# Patient Record
Sex: MALE | Race: WHITE | NOT HISPANIC OR LATINO | URBAN - METROPOLITAN AREA
[De-identification: names, ages, dates, MRNs, and addresses within clinical notes are randomized per-mention and may not be internally consistent; named-entity substitution may affect disease eponyms.]

---

## 2018-05-13 ENCOUNTER — EMERGENCY (EMERGENCY)
Facility: HOSPITAL | Age: 38
LOS: 1 days | Discharge: HOME | End: 2018-05-13
Attending: EMERGENCY MEDICINE
Payer: SELF-PAY

## 2018-05-13 VITALS
RESPIRATION RATE: 15 BRPM | HEART RATE: 57 BPM | DIASTOLIC BLOOD PRESSURE: 67 MMHG | SYSTOLIC BLOOD PRESSURE: 100 MMHG | TEMPERATURE: 98 F | OXYGEN SATURATION: 99 %

## 2018-05-13 VITALS — WEIGHT: 154.98 LBS | HEIGHT: 69 IN

## 2018-05-13 PROCEDURE — 12002 RPR S/N/AX/GEN/TRNK2.6-7.5CM: CPT

## 2018-05-13 PROCEDURE — 99283 EMERGENCY DEPT VISIT LOW MDM: CPT | Mod: 25

## 2018-05-13 PROCEDURE — 73130 X-RAY EXAM OF HAND: CPT

## 2018-05-13 PROCEDURE — 73130 X-RAY EXAM OF HAND: CPT | Mod: 26,LT

## 2018-05-13 RX ORDER — IBUPROFEN 200 MG
600 TABLET ORAL ONCE
Qty: 0 | Refills: 0 | Status: COMPLETED | OUTPATIENT
Start: 2018-05-13 | End: 2018-05-13

## 2018-05-13 RX ADMIN — Medication 600 MILLIGRAM(S): at 22:30

## 2018-05-13 NOTE — ED PROVIDER NOTE - OBJECTIVE STATEMENT
36 y/o M pt with PMHx and PSHx presents to the ED c/o pain and a cut on L hand 4th digit after tripping over a garbage bag containing metal and glass. Pt reports that he is UTD with tetanus. Pt denies any other complaints. NKDA.

## 2018-05-13 NOTE — ED PROCEDURE NOTE - CPROC ED POST PROC CARE GUIDE1
Instructed patient/caregiver to follow-up with primary care physician./Verbal/written post procedure instructions were given to patient/caregiver./Instructed patient/caregiver regarding signs and symptoms of infection./Keep the cast/splint/dressing clean and dry.
Instructed patient/caregiver regarding signs and symptoms of infection./Verbal/written post procedure instructions were given to patient/caregiver./Instructed patient/caregiver to follow-up with primary care physician.

## 2018-05-13 NOTE — ED PROVIDER NOTE - PHYSICAL EXAMINATION
SKIN: 3cm laceration to dorsal aspect of L hand 4th digit. No tenderness, no bone involvement. SKIN: 1cm laceration to dorsal aspect of L hand 4th digit. No tenderness, no bone involvement.

## 2018-05-13 NOTE — ED ADULT NURSE NOTE - OBJECTIVE STATEMENT
states he tripped and fell hit a garbage can with,cut  left 4th finger with a piece of glass.Left 4th finger irrigated with a NS by  then laceration sutured by .

## 2018-05-13 NOTE — ED PROCEDURE NOTE - CPROC ED TIME OUT STATEMENT1
“Patient's name, , procedure and correct site were confirmed during the [place] Timeout.”
“Patient's name, , procedure and correct site were confirmed during the [place] Timeout.”

## 2018-05-20 ENCOUNTER — EMERGENCY (EMERGENCY)
Facility: HOSPITAL | Age: 38
LOS: 1 days | Discharge: HOME | End: 2018-05-20
Attending: EMERGENCY MEDICINE
Payer: SELF-PAY

## 2018-05-20 VITALS
HEART RATE: 67 BPM | HEIGHT: 67 IN | SYSTOLIC BLOOD PRESSURE: 115 MMHG | OXYGEN SATURATION: 100 % | DIASTOLIC BLOOD PRESSURE: 72 MMHG | RESPIRATION RATE: 16 BRPM | TEMPERATURE: 98 F | WEIGHT: 154.98 LBS

## 2018-05-20 PROCEDURE — G0463: CPT

## 2018-05-20 NOTE — ED ADULT NURSE NOTE - OBJECTIVE STATEMENT
pt came in with c/o 38 y/o male with c/o suture /staple removal. pt wound clean and dry no signs of any infection.

## 2018-05-20 NOTE — ED ADULT TRIAGE NOTE - CHIEF COMPLAINT QUOTE
pt came for suture removal in left hand 4th finger, sutured 1 week ago, wound appears clean & dry, pt denies fevers ,pain, swelling or drainage

## 2018-05-20 NOTE — ED PROVIDER NOTE - OBJECTIVE STATEMENT
38 y/o M with no significant PMHx presents to ED need for suture removal. Pt had five sutures placed to L fourth finger one week ago. Pt denies any fever, pain, discharge or any other complaints.

## 2018-05-20 NOTE — ED PROVIDER NOTE - MEDICAL DECISION MAKING DETAILS
36 y/o M here w/ need for suture removal. Wound is clean, dry and intact, no signs of infection. 5 sutures removed, will discharge pt home.

## 2018-08-01 ENCOUNTER — EMERGENCY (EMERGENCY)
Facility: HOSPITAL | Age: 38
LOS: 1 days | Discharge: HOME | End: 2018-08-01
Attending: EMERGENCY MEDICINE
Payer: SELF-PAY

## 2018-08-01 VITALS
SYSTOLIC BLOOD PRESSURE: 105 MMHG | OXYGEN SATURATION: 100 % | HEART RATE: 55 BPM | RESPIRATION RATE: 16 BRPM | DIASTOLIC BLOOD PRESSURE: 69 MMHG

## 2018-08-01 VITALS
SYSTOLIC BLOOD PRESSURE: 124 MMHG | HEART RATE: 79 BPM | DIASTOLIC BLOOD PRESSURE: 77 MMHG | HEIGHT: 68 IN | RESPIRATION RATE: 16 BRPM | OXYGEN SATURATION: 97 % | TEMPERATURE: 99 F | WEIGHT: 156.09 LBS

## 2018-08-01 LAB
TROPONIN I SERPL-MCNC: <0.015 NG/ML — SIGNIFICANT CHANGE UP (ref 0–0.04)
TROPONIN I SERPL-MCNC: <0.015 NG/ML — SIGNIFICANT CHANGE UP (ref 0–0.04)

## 2018-08-01 PROCEDURE — 99285 EMERGENCY DEPT VISIT HI MDM: CPT | Mod: 25

## 2018-08-01 PROCEDURE — 71045 X-RAY EXAM CHEST 1 VIEW: CPT | Mod: 26

## 2018-08-01 PROCEDURE — 99053 MED SERV 10PM-8AM 24 HR FAC: CPT

## 2018-08-01 PROCEDURE — 71045 X-RAY EXAM CHEST 1 VIEW: CPT

## 2018-08-01 PROCEDURE — 36415 COLL VENOUS BLD VENIPUNCTURE: CPT

## 2018-08-01 PROCEDURE — 84484 ASSAY OF TROPONIN QUANT: CPT

## 2018-08-01 PROCEDURE — 99283 EMERGENCY DEPT VISIT LOW MDM: CPT | Mod: 25

## 2018-08-01 PROCEDURE — 93005 ELECTROCARDIOGRAM TRACING: CPT
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
## 2018-08-01 NOTE — ED PROVIDER NOTE - MEDICAL DECISION MAKING DETAILS
Pt  resting, in no distress, trop x 2 neg, EKG NSR at 76 bpm, CXR NAPD. Pt is well appearing walking with normal gait, stable for discharge and follow up with medical doctor. Pt educated on care and need for follow up. Discussed anticipatory guidance and return precautions. Questions answered. I had a detailed discussion with the patient and/or guardian regarding the historical points, exam findings, and any diagnostic results supporting the discharge diagnosis.  . pt given contact to f/u with cardiologist.

## 2018-08-01 NOTE — ED ADULT NURSE NOTE - NSIMPLEMENTINTERV_GEN_ALL_ED
Implemented All Universal Safety Interventions:  [place] to call system. Call bell, personal items and telephone within reach. Instruct patient to call for assistance. Room bathroom lighting operational. Non-slip footwear when patient is off stretcher. Physically safe environment: no spills, clutter or unnecessary equipment. Stretcher in lowest position, wheels locked, appropriate side rails in place.

## 2019-05-19 ENCOUNTER — EMERGENCY (EMERGENCY)
Facility: HOSPITAL | Age: 39
LOS: 1 days | Discharge: HOME | End: 2019-05-19
Attending: EMERGENCY MEDICINE
Payer: MEDICAID

## 2019-05-19 VITALS
WEIGHT: 145.06 LBS | DIASTOLIC BLOOD PRESSURE: 78 MMHG | RESPIRATION RATE: 16 BRPM | SYSTOLIC BLOOD PRESSURE: 116 MMHG | HEIGHT: 69 IN | OXYGEN SATURATION: 100 % | HEART RATE: 89 BPM | TEMPERATURE: 98 F

## 2019-05-19 PROCEDURE — 99281 EMR DPT VST MAYX REQ PHY/QHP: CPT

## 2019-05-19 NOTE — ED ADULT NURSE NOTE - CHPI ED NUR SYMPTOMS NEG
no nausea/no tingling/no weakness/no dizziness/no fever/no chills/no pain/no vomiting/no decreased eating/drinking

## 2019-05-20 NOTE — ED PROVIDER NOTE - ENMT, MLM
Airway patent, Nasal mucosa clear. Mouth with normal mucosa. Throat has no vesicles, no oropharyngeal exudates and uvula is midline.  retained suture over Left lower gingiva

## 2019-05-20 NOTE — ED PROVIDER NOTE - CLINICAL SUMMARY MEDICAL DECISION MAKING FREE TEXT BOX
patient presents for suture removal. 1 suture visualized overlying gingiva and removed.   patient stable for discharge.

## 2019-05-20 NOTE — ED PROVIDER NOTE - NSFOLLOWUPINSTRUCTIONS_ED_ALL_ED_FT
Continue proper dental hygiene, brush teeth after meals and use mouthwash daily.  Followup with dentistry for reevaluation.

## 2019-05-20 NOTE — ED PROVIDER NOTE - OBJECTIVE STATEMENT
39yo M presents to ED for suture removal. Reports he had a tooth extracted 2 wks ago and dentist placed sutures which he was supposed to have removed 1wk later. Reports he was unable to see his dentist at that time thus come to ED tonight for suture removal. Patient reports he does not know how many sutures were placed by dentist.

## 2019-11-08 NOTE — ED ADULT TRIAGE NOTE - AS HEIGHT TYPE
stated
Bill For Individual Tests Below?: no
Venipuncture Paragraph: An alcohol pad was applied to the venipuncture site. Venipuncture was performed using a butterfly needle. Pressure and a bandaid was applied to the site. No complications were noted.
Detail Level: None

## 2020-05-13 NOTE — ED ADULT TRIAGE NOTE - PAIN RATING/NUMBER SCALE (0-10): ACTIVITY
4 Colchicine Pregnancy And Lactation Text: This medication is Pregnancy Category C and isn't considered safe during pregnancy. It is excreted in breast milk.

## 2021-08-21 ENCOUNTER — EMERGENCY (EMERGENCY)
Facility: HOSPITAL | Age: 41
LOS: 1 days | Discharge: HOME | End: 2021-08-21
Attending: EMERGENCY MEDICINE
Payer: SELF-PAY

## 2021-08-21 PROCEDURE — 99053 MED SERV 10PM-8AM 24 HR FAC: CPT

## 2021-08-21 PROCEDURE — 99285 EMERGENCY DEPT VISIT HI MDM: CPT

## 2021-08-22 VITALS
HEART RATE: 72 BPM | SYSTOLIC BLOOD PRESSURE: 106 MMHG | TEMPERATURE: 98 F | DIASTOLIC BLOOD PRESSURE: 72 MMHG | WEIGHT: 139.99 LBS | HEIGHT: 69 IN | RESPIRATION RATE: 18 BRPM | OXYGEN SATURATION: 97 %

## 2021-08-22 PROCEDURE — 70486 CT MAXILLOFACIAL W/O DYE: CPT | Mod: 26,MA

## 2021-08-22 PROCEDURE — 72125 CT NECK SPINE W/O DYE: CPT | Mod: 26,MA

## 2021-08-22 PROCEDURE — 99284 EMERGENCY DEPT VISIT MOD MDM: CPT | Mod: 25

## 2021-08-22 PROCEDURE — 72125 CT NECK SPINE W/O DYE: CPT | Mod: MA

## 2021-08-22 PROCEDURE — 70486 CT MAXILLOFACIAL W/O DYE: CPT | Mod: MA
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
## 2021-08-22 NOTE — ED PROVIDER NOTE - MUSCULOSKELETAL, MLM
Spine appears normal, range of motion is not limited, no muscle. moving all extremity freely. mild ttp at right deltoid and lateral right knee, no swelling
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
## 2021-08-22 NOTE — ED PROVIDER NOTE - PRINCIPAL DIAGNOSIS
Fabian Diana discharged to home accompanied by RN.   Patient provided with the following educational materials upon discharge:  Colostomy education.   Valuables and belongings sent with patient.   discharge summary, discharge instructions, medications and follow up appointments reviewed with patient and RN.  Patient and RN verbalized understanding.    Patient educated extensively regarding pain control and patient states \"I will be in pain either way if I'm at home or here. I'd rather be at home.\"    Contusion

## 2021-08-22 NOTE — ED PROVIDER NOTE - PATIENT PORTAL LINK FT
You can access the FollowMyHealth Patient Portal offered by [facility] by registering at the following website: [email or web address]. By joining [facility]’s FollowMyHealth portal, you will also be able to view your health information using other applications (apps) compatible with our system.

## 2021-08-22 NOTE — ED ADULT NURSE NOTE - NS_SISCREENINGSR_GEN_ALL_ED
THAO SHIELDS  28y  Female [id number]    HPI: 29 yo  LMP 3/19/19 here with +u bHCG on  and 4 days of abdominal cramping+vaginal bleeding. Patient took home pregnancy test on  as she had missed her period which is usually regular and was having some abdominal cramping. Two days later she started having vaginal bleeding and passing of clots, she used 4 pads in the last 24 hours, not fully soaked. This is similar to her regular periods but she was concerned about passage of clots in the setting of + pregnancy test. She has not seen an OB/GYN in almost three years, does not know previous providers name. She has mild abdominal pain 4-5/10 similar to periods not associate with position, eating, or moving. She denies fever, chills, CP, SOB, n/v, dysuria, constipation, diarrhea.    Name of GYN Physician: Unknown    POB:  2.5 years ago, +bHCG prior to laparoscopic cholecystectomy. She then had a D&C outpatient two to three weeks later. Believes she was about 10-12 weeks at that time.   Pgyn: Herpes dx'd three years ago by OB, no lesions that she knows of, no antivirals. Denies fibroids, cysts, endometriosis, Abnormal pap smears (last pap 3 years ago).     Home meds: None    Allergies: No Known Allergies    PAST MEDICAL & SURGICAL HISTORY:  TOP s/p D&C     No significant past surgical history  H/O hernia repair: age 7  Laparoscopic cholecystectomy 2016      Social History:  Denies smoking use, drug use, alcohol use.   +occasional social alcohol use    Vital Signs Last 24 Hrs  T(C): 36.7 (2019 15:46), Max: 36.9 (2019 12:05)  T(F): 98 (2019 15:46), Max: 98.4 (2019 12:05)  HR: 78 (2019 15:46) (72 - 78)  BP: 124/88 (2019 15:46) (124/88 - 132/85)  BP(mean): --  RR: 18 (2019 15:46) (17 - 18)  SpO2: 99% (2019 15:46) (98% - 99%)    Physical Exam:   General: sitting comftorably in bed, NAD   HEENT: neck supple, full ROM  CV: RR S1S2 no m/r/g  Lungs: CTA b/l, good air flow b/l   Back: No CVA tenderness  Abd: Soft, non-tender, non-distended.  Bowel sounds present.    :  No bleeding on pad.    External labia wnl.  Bimanual exam with no cervical motion tenderness, uterus wnl, adnexa non palpable b/l.  Cervix closed vs. Cervix dilated    cm   Speculum Exam: No active bleeding from os.  Physiologic discharge.    Ext: non-tender b/l, no edema     LABS:                              14.1   6.6   )-----------( 258      ( 2019 13:12 )             40.6         137  |  103  |  10  ----------------------------<  99  4.2   |  23  |  0.54    Ca    9.2      2019 13:12    TPro  7.4  /  Alb  4.6  /  TBili  0.5  /  DBili  x   /  AST  22  /  ALT  23  /  AlkPhos  38<L>      I&O's Detail      Urinalysis Basic - ( 2019 12:50 )    Color: Light Orange / Appearance: Slightly Turbid / S.030 / pH: x  Gluc: x / Ketone: Negative  / Bili: Negative / Urobili: Negative   Blood: x / Protein: 30 mg/dL / Nitrite: Negative   Leuk Esterase: Negative / RBC: 552 /hpf / WBC 7 /HPF   Sq Epi: x / Non Sq Epi: 5 /hpf / Bacteria: Negative        RADIOLOGY & ADDITIONAL STUDIES: THAO SHIELDS  28y  Female [id number]    HPI: 27 yo  LMP 3/19/19 here with +u bHCG on  and 4 days of abdominal cramping+vaginal bleeding. Patient took home pregnancy test on  as she had missed her period which is usually regular and was having some abdominal cramping. Two days later she started having vaginal bleeding and passing of clots, she used 4 pads in the last 24 hours, not fully soaked. This is similar to her regular periods but she was concerned about passage of clots in the setting of + pregnancy test. She has not seen an OB/GYN in almost three years, does not know previous providers name. She has mild abdominal pain 4-5/10 similar to periods not associate with position, eating, or moving. She denies fever, chills, CP, SOB, n/v, dysuria, constipation, diarrhea.    Name of GYN Physician: Unknown    POB:  2.5 years ago, +bHCG prior to laparoscopic cholecystectomy. She then had a D&C outpatient two to three weeks later. Believes she was about 10-12 weeks at that time.     PGyn: Herpes dx'd three years ago by OB, no lesions that she knows of, no antivirals. Denies fibroids, cysts, endometriosis, Abnormal pap smears (last pap 3 years ago). Never used hormonal contraception, condom use inconsistent.    Home meds: None    Allergies: No Known Allergies    PAST MEDICAL & SURGICAL HISTORY:  TOP s/p D&C     No significant past surgical history  H/O hernia repair: age 7  Laparoscopic cholecystectomy 2016      Social History:  Denies smoking use, drug use.  +occasional social alcohol use. Currently has two male partners.    Vital Signs Last 24 Hrs  T(C): 36.7 (2019 15:46), Max: 36.9 (2019 12:05)  T(F): 98 (2019 15:46), Max: 98.4 (2019 12:05)  HR: 78 (2019 15:46) (72 - 78)  BP: 124/88 (2019 15:46) (124/88 - 132/85)  BP(mean): --  RR: 18 (2019 15:46) (17 - 18)  SpO2: 99% (2019 15:46) (98% - 99%)    Physical Exam:   General: sitting comfortably in bed, NAD   Back: No CVA tenderness  Abd: Soft, mild LLQ tenderness with deep palpation, non-distended.  Bowel sounds present.    :  Scant bleeding on pad. External labia wnl.  Bimanual exam with no cervical motion tenderness, uterus wnl, adnexa non palpable b/l, L adnexal tenderness on exam.  Cervix closed.   Speculum Exam: No active bleeding from os. Dark red blood in the vault 5-10cc with some clots.    Ext: non-tender b/l, no edema     LABS:                        14.1   6.6   )-----------( 258      ( 2019 13:12 )             40.6         137  |  103  |  10  ----------------------------<  99  4.2   |  23  |  0.54    Ca    9.2      2019 13:12    TPro  7.4  /  Alb  4.6  /  TBili  0.5  /  DBili  x   /  AST  22  /  ALT  23  /  AlkPhos  38<L>      I&O's Detail      Urinalysis Basic - ( 2019 12:50 )    Color: Light Orange / Appearance: Slightly Turbid / S.030 / pH: x  Gluc: x / Ketone: Negative  / Bili: Negative / Urobili: Negative   Blood: x / Protein: 30 mg/dL / Nitrite: Negative   Leuk Esterase: Negative / RBC: 552 /hpf / WBC 7 /HPF   Sq Epi: x / Non Sq Epi: 5 /hpf / Bacteria: Negative    RADIOLOGY & ADDITIONAL STUDIES:  < from: US Transvaginal, OB (19 @ 14:10) >    INTERPRETATION:  CLINICAL INFORMATION: Pregnant. Vaginal bleeding.   Abdominal pain.    LMP: 3/19/2019. Gestational age would be 5 weeks, 1 day by LMP.    COMPARISON: 2016.    TECHNIQUE:     The transabdominal and transvaginal pelvic sonography was performed.   Color and spectral Doppler imaging was performed to assess for blood flow   within the ovaries.        FINDINGS:    Uterus: Anteverted.  7.8 cm x 3.6 cm x 4.2 cm. No intrauterine gestation.    Endometrium: 6 mm. Minimally heterogeneous.    Right ovary: 2.5 cm x 2.1 cm x 1.7 cm. Arterial and venous blood flow is   seen within the right ovarianparenchyma with color and spectral Doppler   imaging. Within normal limits.        Left ovary: 2.6 cm x 1.8 cm x 1.6 cm. Arterial and venous blood flow is   seen within the left ovarian parenchyma with color and spectral Doppler   imaging. Within normal limits.        No extra ovarian mass.    Fluid: Trace free fluid within the inferior cul-de-sac.    IMPRESSION:    No intrauterine gestation.    No extra ovarian mass.    Pregnancy of unknown location.    Cannot exclude loss of pregnancy.    Please correlate with serial beta hCG levels. Recommend follow-up repeat   pelvic sonography as warranted.    < end of copied text > Negative

## 2022-05-23 ENCOUNTER — NON-APPOINTMENT (OUTPATIENT)
Age: 42
End: 2022-05-23

## 2022-05-23 ENCOUNTER — EMERGENCY (EMERGENCY)
Facility: HOSPITAL | Age: 42
LOS: 1 days | Discharge: LEFT BEFORE DISCHARGE | End: 2022-05-23
Attending: EMERGENCY MEDICINE
Payer: MEDICAID

## 2022-05-23 VITALS
WEIGHT: 135.36 LBS | RESPIRATION RATE: 18 BRPM | OXYGEN SATURATION: 97 % | DIASTOLIC BLOOD PRESSURE: 77 MMHG | TEMPERATURE: 99 F | HEART RATE: 64 BPM | HEIGHT: 69 IN | SYSTOLIC BLOOD PRESSURE: 117 MMHG

## 2022-05-23 PROCEDURE — L9991: CPT

## 2022-05-23 PROCEDURE — 73130 X-RAY EXAM OF HAND: CPT | Mod: 26,RT

## 2022-05-23 PROCEDURE — 73130 X-RAY EXAM OF HAND: CPT

## 2022-05-23 PROCEDURE — 99283 EMERGENCY DEPT VISIT LOW MDM: CPT | Mod: 25

## 2022-05-23 RX ORDER — IBUPROFEN 200 MG
600 TABLET ORAL ONCE
Refills: 0 | Status: COMPLETED | OUTPATIENT
Start: 2022-05-23 | End: 2022-05-23

## 2022-05-23 RX ADMIN — Medication 600 MILLIGRAM(S): at 02:10

## 2022-05-23 NOTE — ED ADULT NURSE REASSESSMENT NOTE - NS ED NURSE REASSESS COMMENT FT1
0340hr- patient wants to leave to eat and come back in 2hrs, informed that he needs to register again once he leaves and come back.  Was called 30 minutes right after he was registered and was not available.- [place]
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
## 2022-05-23 NOTE — ED ADULT NURSE NOTE - OBJECTIVE STATEMENT
pt states he fell on the street, pt c/o right hand pain, pt is aox3 , steady gait, medicated as prescribed

## 2022-05-23 NOTE — ED PROVIDER NOTE - CLINICAL SUMMARY MEDICAL DECISION MAKING FREE TEXT BOX
At approximately 345a pt informed me that he was leaving ED to get food and will return. Pt left ED prior to full assessment.  Pt eloped.

## 2022-07-26 NOTE — ED ADULT NURSE NOTE - NS ED NURSE DC INFO COMPLEXITY
Simple: Patient demonstrates quick and easy understanding Cibinqo Pregnancy And Lactation Text: It is unknown if this medication will adversely affect pregnancy or breast feeding.  You should not take this medication if you are currently pregnant or planning a pregnancy or while breastfeeding.

## 2022-12-24 NOTE — ED ADULT TRIAGE NOTE - HEART RATE (BEATS/MIN)
64 [Change in Activity] : no change in activity [Fever] : fever [Rash] : no rash [Joint Pains] : no joint pains [Redness] : no redness [Cough] : no cough [Change in Appetite] : no change in appetite [Nasal Stuffiness] : no nasal congestion [FreeTextEntry3] : no desquamation [Swollen Glands] : no swollen glands

## 2024-02-19 ENCOUNTER — EMERGENCY (EMERGENCY)
Facility: HOSPITAL | Age: 44
LOS: 1 days | Discharge: HOME | End: 2024-02-19
Attending: STUDENT IN AN ORGANIZED HEALTH CARE EDUCATION/TRAINING PROGRAM
Payer: MEDICAID

## 2024-02-19 VITALS
DIASTOLIC BLOOD PRESSURE: 77 MMHG | OXYGEN SATURATION: 97 % | TEMPERATURE: 98 F | SYSTOLIC BLOOD PRESSURE: 136 MMHG | HEART RATE: 102 BPM | WEIGHT: 143.3 LBS | RESPIRATION RATE: 16 BRPM

## 2024-02-19 LAB
ALBUMIN SERPL ELPH-MCNC: 3.9 G/DL — SIGNIFICANT CHANGE UP (ref 3.5–5)
ALP SERPL-CCNC: 72 U/L — SIGNIFICANT CHANGE UP (ref 40–120)
ALT FLD-CCNC: 23 U/L DA — SIGNIFICANT CHANGE UP (ref 10–60)
ANION GAP SERPL CALC-SCNC: 2 MMOL/L — LOW (ref 5–17)
APTT BLD: 37 SEC — HIGH (ref 24.5–35.6)
AST SERPL-CCNC: 10 U/L — SIGNIFICANT CHANGE UP (ref 10–40)
BASOPHILS # BLD AUTO: 0.03 K/UL — SIGNIFICANT CHANGE UP (ref 0–0.2)
BASOPHILS NFR BLD AUTO: 0.4 % — SIGNIFICANT CHANGE UP (ref 0–2)
BILIRUB SERPL-MCNC: 0.4 MG/DL — SIGNIFICANT CHANGE UP (ref 0.2–1.2)
BUN SERPL-MCNC: 15 MG/DL — SIGNIFICANT CHANGE UP (ref 7–18)
CALCIUM SERPL-MCNC: 9.3 MG/DL — SIGNIFICANT CHANGE UP (ref 8.4–10.5)
CHLORIDE SERPL-SCNC: 107 MMOL/L — SIGNIFICANT CHANGE UP (ref 96–108)
CO2 SERPL-SCNC: 30 MMOL/L — SIGNIFICANT CHANGE UP (ref 22–31)
CREAT SERPL-MCNC: 0.79 MG/DL — SIGNIFICANT CHANGE UP (ref 0.5–1.3)
EGFR: 114 ML/MIN/1.73M2 — SIGNIFICANT CHANGE UP
EOSINOPHIL # BLD AUTO: 0.09 K/UL — SIGNIFICANT CHANGE UP (ref 0–0.5)
EOSINOPHIL NFR BLD AUTO: 1.1 % — SIGNIFICANT CHANGE UP (ref 0–6)
GLUCOSE SERPL-MCNC: 80 MG/DL — SIGNIFICANT CHANGE UP (ref 70–99)
HCT VFR BLD CALC: 40.4 % — SIGNIFICANT CHANGE UP (ref 39–50)
HGB BLD-MCNC: 13.5 G/DL — SIGNIFICANT CHANGE UP (ref 13–17)
IMM GRANULOCYTES NFR BLD AUTO: 0.2 % — SIGNIFICANT CHANGE UP (ref 0–0.9)
INR BLD: 1.08 RATIO — SIGNIFICANT CHANGE UP (ref 0.85–1.18)
LYMPHOCYTES # BLD AUTO: 1.76 K/UL — SIGNIFICANT CHANGE UP (ref 1–3.3)
LYMPHOCYTES # BLD AUTO: 21.5 % — SIGNIFICANT CHANGE UP (ref 13–44)
MCHC RBC-ENTMCNC: 30.8 PG — SIGNIFICANT CHANGE UP (ref 27–34)
MCHC RBC-ENTMCNC: 33.4 GM/DL — SIGNIFICANT CHANGE UP (ref 32–36)
MCV RBC AUTO: 92.2 FL — SIGNIFICANT CHANGE UP (ref 80–100)
MONOCYTES # BLD AUTO: 0.51 K/UL — SIGNIFICANT CHANGE UP (ref 0–0.9)
MONOCYTES NFR BLD AUTO: 6.2 % — SIGNIFICANT CHANGE UP (ref 2–14)
NEUTROPHILS # BLD AUTO: 5.77 K/UL — SIGNIFICANT CHANGE UP (ref 1.8–7.4)
NEUTROPHILS NFR BLD AUTO: 70.6 % — SIGNIFICANT CHANGE UP (ref 43–77)
NRBC # BLD: 0 /100 WBCS — SIGNIFICANT CHANGE UP (ref 0–0)
PLATELET # BLD AUTO: 212 K/UL — SIGNIFICANT CHANGE UP (ref 150–400)
POTASSIUM SERPL-MCNC: 3.9 MMOL/L — SIGNIFICANT CHANGE UP (ref 3.5–5.3)
POTASSIUM SERPL-SCNC: 3.9 MMOL/L — SIGNIFICANT CHANGE UP (ref 3.5–5.3)
PROT SERPL-MCNC: 6.9 G/DL — SIGNIFICANT CHANGE UP (ref 6–8.3)
PROTHROM AB SERPL-ACNC: 12.3 SEC — SIGNIFICANT CHANGE UP (ref 9.5–13)
RBC # BLD: 4.38 M/UL — SIGNIFICANT CHANGE UP (ref 4.2–5.8)
RBC # FLD: 12.4 % — SIGNIFICANT CHANGE UP (ref 10.3–14.5)
SODIUM SERPL-SCNC: 139 MMOL/L — SIGNIFICANT CHANGE UP (ref 135–145)
WBC # BLD: 8.18 K/UL — SIGNIFICANT CHANGE UP (ref 3.8–10.5)
WBC # FLD AUTO: 8.18 K/UL — SIGNIFICANT CHANGE UP (ref 3.8–10.5)

## 2024-02-19 PROCEDURE — 99285 EMERGENCY DEPT VISIT HI MDM: CPT

## 2024-02-19 PROCEDURE — 85610 PROTHROMBIN TIME: CPT

## 2024-02-19 PROCEDURE — 80053 COMPREHEN METABOLIC PANEL: CPT

## 2024-02-19 PROCEDURE — 90715 TDAP VACCINE 7 YRS/> IM: CPT

## 2024-02-19 PROCEDURE — 36415 COLL VENOUS BLD VENIPUNCTURE: CPT

## 2024-02-19 PROCEDURE — 70491 CT SOFT TISSUE NECK W/DYE: CPT | Mod: MA

## 2024-02-19 PROCEDURE — 85025 COMPLETE CBC W/AUTO DIFF WBC: CPT

## 2024-02-19 PROCEDURE — 99284 EMERGENCY DEPT VISIT MOD MDM: CPT | Mod: 25

## 2024-02-19 PROCEDURE — 96374 THER/PROPH/DIAG INJ IV PUSH: CPT

## 2024-02-19 PROCEDURE — 90471 IMMUNIZATION ADMIN: CPT

## 2024-02-19 PROCEDURE — 70491 CT SOFT TISSUE NECK W/DYE: CPT | Mod: 26,MA

## 2024-02-19 PROCEDURE — 85730 THROMBOPLASTIN TIME PARTIAL: CPT

## 2024-02-19 RX ORDER — AMPICILLIN SODIUM AND SULBACTAM SODIUM 250; 125 MG/ML; MG/ML
3 INJECTION, POWDER, FOR SUSPENSION INTRAMUSCULAR; INTRAVENOUS ONCE
Refills: 0 | Status: COMPLETED | OUTPATIENT
Start: 2024-02-19 | End: 2024-02-19

## 2024-02-19 RX ORDER — TETANUS TOXOID, REDUCED DIPHTHERIA TOXOID AND ACELLULAR PERTUSSIS VACCINE, ADSORBED 5; 2.5; 8; 8; 2.5 [IU]/.5ML; [IU]/.5ML; UG/.5ML; UG/.5ML; UG/.5ML
0.5 SUSPENSION INTRAMUSCULAR ONCE
Refills: 0 | Status: COMPLETED | OUTPATIENT
Start: 2024-02-19 | End: 2024-02-19

## 2024-02-19 RX ORDER — TETANUS TOXOID, REDUCED DIPHTHERIA TOXOID AND ACELLULAR PERTUSSIS VACCINE, ADSORBED 5; 2.5; 8; 8; 2.5 [IU]/.5ML; [IU]/.5ML; UG/.5ML; UG/.5ML; UG/.5ML
0.5 SUSPENSION INTRAMUSCULAR ONCE
Refills: 0 | Status: DISCONTINUED | OUTPATIENT
Start: 2024-02-19 | End: 2024-02-19

## 2024-02-19 RX ORDER — SODIUM CHLORIDE 9 MG/ML
1000 INJECTION, SOLUTION INTRAVENOUS ONCE
Refills: 0 | Status: COMPLETED | OUTPATIENT
Start: 2024-02-19 | End: 2024-02-19

## 2024-02-19 RX ADMIN — TETANUS TOXOID, REDUCED DIPHTHERIA TOXOID AND ACELLULAR PERTUSSIS VACCINE, ADSORBED 0.5 MILLILITER(S): 5; 2.5; 8; 8; 2.5 SUSPENSION INTRAMUSCULAR at 22:53

## 2024-02-19 RX ADMIN — SODIUM CHLORIDE 1000 MILLILITER(S): 9 INJECTION, SOLUTION INTRAVENOUS at 23:08

## 2024-02-19 RX ADMIN — AMPICILLIN SODIUM AND SULBACTAM SODIUM 200 GRAM(S): 250; 125 INJECTION, POWDER, FOR SUSPENSION INTRAMUSCULAR; INTRAVENOUS at 22:53

## 2024-02-19 NOTE — ED PROVIDER NOTE - NSFOLLOWUPINSTRUCTIONS_ED_ALL_ED_FT
- Please follow up with your Primary Care Doctor within 1 week. Bring your results from today.    - Tylenol up to 650 mg every 6 hours as needed for pain and/or Ibuprofen up to 400 mg every 6 hours as needed for pain.    - Take prescribed medication as indicated:  Augmentin    - Be sure to return to the ED if you develop new, worsening, or any distressing symptoms.

## 2024-02-19 NOTE — ED ADULT NURSE NOTE - NSFALLRISKFACTORS_ED_ALL_ED
No indicators present
[facility] [place] [place] [id number]  Phone: [phone]  Fax: [phone]  Follow Up Time:

## 2024-02-19 NOTE — ED PROVIDER NOTE - PHYSICAL EXAMINATION
CONSTITUTIONAL: non-toxic, well appearing  SKIN: no rash, no petechiae.  EYES: PERRL, EOMI, pink conjunctiva, anicteric  HEENT: tongue and uvular midline, no exudates, moist mucous membranes, no pooling of secretions, 0.5 cm linear frontal scalp laceration without active bleeding  NECK: Supple; no meningismus, no JVD, trachea midline, 2 small puncture wounds to anterior neck, does not appear to violate platysma  CARD: RRR, no murmurs, equal radial pulses bilaterally 2+  RESP: CTAB, no respiratory distress  ABD: Soft, non-tender, non-distended, no peritoneal signs, no CVA tenderness  EXT: Normal ROM x4. No edema.   NEURO: Alert, oriented. Neuro exam nonfocal. 5/5 strength BUE and BLE.   PSYCH: Cooperative, appropriate.

## 2024-02-19 NOTE — ED PROVIDER NOTE - CLINICAL SUMMARY MEDICAL DECISION MAKING FREE TEXT BOX
Maria Del Rosario: 43-year-old male with no pertinent past medical history presents status post cat bite prior to arrival.  Patient states he gave his friend's cat chicken, states his cat jumped on him and bit his neck.  Patient states cat bit anterior neck and scratched his scalp.  Patient states he cleaned wound with soap and water prior to arrival.  Patient admits to dizziness and subjective hoarseness, but denies any fevers, chest pain, shortness of breath, vision change, numbness, weakness, difficulty speaking, difficulty walking, drooling, or rash.  Patient unsure of last tetanus vaccine.  Patient states his neighbor is able to observe cat, unsure of rabies vaccination status.  Physical exam per above. No stridor, trachea midline, wounds do not appear to violate platysma, neuro exam nonfocal. 0.5 cm frontal scalp laceration noted, will allow to close by secondary intention. Wounds copiously irrigated with NS. Will obtain labs, imaging eval for trachea/deep space injury, provide empiric abx/update TDAP with dispo pending workup. Maria Del Rosario: 43-year-old male with no pertinent past medical history presents status post cat bite prior to arrival.  Patient states he gave his friend's cat chicken, states his cat jumped on him and bit his neck.  Patient states cat bit anterior neck and scratched his scalp.  Patient states he cleaned wound with soap and water prior to arrival.  Patient admits to dizziness and subjective hoarseness, but denies any fevers, chest pain, shortness of breath, vision change, numbness, weakness, difficulty speaking, difficulty walking, drooling, or rash.  Patient unsure of last tetanus vaccine.  Patient states his neighbor is able to observe cat, unsure of rabies vaccination status.  Physical exam per above. No stridor, trachea midline, wounds do not appear to violate platysma, neuro exam nonfocal. 0.5 cm frontal scalp laceration noted, will allow to close by secondary intention. Wounds copiously irrigated with NS. Will obtain labs, imaging eval for trachea/deep space injury, provide empiric abx/update TDAP with dispo pending workup.    ===================================  update (Juancho Cool DO; emergency medicine physician) Patient was signed out to me from Dr. Payan awaiting CT scan.  Patient about to get CT scan we will follow-up with results.  Will not close small laceration to scalp as it was inflicted by Claw.  Antibiotic sent to pharmacy. Maria Del Rosario: 43-year-old male with no pertinent past medical history presents status post cat bite prior to arrival.  Patient states he gave his friend's cat chicken, states his cat jumped on him and bit his neck.  Patient states cat bit anterior neck and scratched his scalp.  Patient states he cleaned wound with soap and water prior to arrival.  Patient admits to dizziness and subjective hoarseness, but denies any fevers, chest pain, shortness of breath, vision change, numbness, weakness, difficulty speaking, difficulty walking, drooling, or rash.  Patient unsure of last tetanus vaccine.  Patient states his neighbor is able to observe cat, unsure of rabies vaccination status.  Physical exam per above. No stridor, trachea midline, wounds do not appear to violate platysma, neuro exam nonfocal. 0.5 cm frontal scalp laceration noted, will allow to close by secondary intention. Wounds copiously irrigated with NS. Will obtain labs, imaging eval for trachea/deep space injury, provide empiric abx/update TDAP with dispo pending workup.    ===================================  update (Juancho Cool DO; emergency medicine physician) Patient was signed out to me from Dr. Payan awaiting CT scan.  Pt with superficial scratches to neck, no stridor/excessive drooling, no crepitus to palpation. Patient about to get CT scan we will follow-up with results.  Will not close small laceration to scalp as it was inflicted by Claw.  Antibiotic sent to pharmacy.    ===================================  update (Juancho Cool DO; emergency medicine physician) CT results reasssuring. Pt understands anticipatory guidance and expresses understanding of return precautions. Safe for dc.

## 2024-02-19 NOTE — ED PROVIDER NOTE - IV ALTEPLASE EXCL ABS HIDDEN
INTERVAL EVENTS: no acute events    PAST MEDICAL & SURGICAL HISTORY:  Asthma  Asthma    Depressive disorder  Depression    Hyperlipidemia  HLD (hyperlipidemia)    Type 2 diabetes mellitus  DM (diabetes mellitus)    Essential hypertension  HTN (hypertension)    Local infection of skin and subcutaneous tissue  Toe infection    Type 2 diabetes mellitus with neurological manifestations  Neuropathy in diabetes    Cytomegalovirus infection not present  No significant past surgical history    S/P cholecystectomy        MEDICATIONS  (STANDING):  albuterol    90 MICROgram(s) HFA Inhaler 2 Puff(s) Inhalation daily  albuterol/ipratropium for Nebulization 3 milliLiter(s) Nebulizer every 6 hours  atorvastatin 40 milliGRAM(s) Oral at bedtime  calcium acetate 667 milliGRAM(s) Oral three times a day with meals  carvedilol 12.5 milliGRAM(s) Oral every 12 hours  chlorhexidine 2% Cloths 1 Application(s) Topical <User Schedule>  clonazePAM  Tablet 0.25 milliGRAM(s) Oral at bedtime  dextrose 5%. 1000 milliLiter(s) (100 mL/Hr) IV Continuous <Continuous>  dextrose 5%. 1000 milliLiter(s) (50 mL/Hr) IV Continuous <Continuous>  dextrose 50% Injectable 25 Gram(s) IV Push once  dextrose 50% Injectable 25 Gram(s) IV Push once  dextrose 50% Injectable 12.5 Gram(s) IV Push once  DULoxetine 30 milliGRAM(s) Oral every 24 hours  glucagon  Injectable 1 milliGRAM(s) IntraMuscular once  heparin   Injectable 5000 Unit(s) SubCutaneous every 8 hours  hydrALAZINE 50 milliGRAM(s) Oral three times a day  insulin lispro (ADMELOG) corrective regimen sliding scale   SubCutaneous every 6 hours  NIFEdipine XL 90 milliGRAM(s) Oral every 24 hours  nitroglycerin    Patch 0.3 mG/Hr(s) 1 patch Transdermal daily  pantoprazole    Tablet 40 milliGRAM(s) Oral before breakfast  polyethylene glycol 3350 17 Gram(s) Oral two times a day  PPD  5 Tuberculin Unit(s) Injectable 5 Unit(s) IntraDermal once  senna 2 Tablet(s) Oral at bedtime  traZODone 50 milliGRAM(s) Oral every 24 hours    MEDICATIONS  (PRN):  acetaminophen     Tablet .. 650 milliGRAM(s) Oral every 6 hours PRN Mild Pain (1 - 3)  benzonatate 100 milliGRAM(s) Oral three times a day PRN Cough  dextrose Oral Gel 15 Gram(s) Oral once PRN Blood Glucose LESS THAN 70 milliGRAM(s)/deciliter    T(F): 98.9 (12-02-23 @ 12:25), Max: 99.1 (12-02-23 @ 06:17)  HR: 70 (12-02-23 @ 12:25) (69 - 82)  BP: 135/73 (12-02-23 @ 12:25) (126/66 - 152/80)  BP(mean): 97 (12-01-23 @ 21:00) (97 - 97)  ABP: --  ABP(mean): --  RR: 18 (12-02-23 @ 12:25) (14 - 19)  SpO2: 96% (12-02-23 @ 12:25) (93% - 96%)    I/O Detail 24H    02 Dec 2023 07:01  -  02 Dec 2023 12:56  --------------------------------------------------------  IN:  Total IN: 0 mL    OUT:    Other (mL): 700 mL  Total OUT: 700 mL    Total NET: -700 mL          PHYSICAL EXAM:  General: NAD, lying comfortably in bed  Head: NC/AT; MMM; EOMI;  Neck: Supple; no JVD; catheter on right side of neck removed, no hematoma  Respiratory: CTAB; no wheezes/rales/rhonchi  Cardiovascular: Regular rhythm/rate; S1/S2+, no murmurs, rubs gallops   Gastrointestinal: Soft; NTND; bowel sounds normal and present  Extremities: WWP; no edema/cyanosis  Neurological: A&Ox3, CNII-XII grossly intact; no obvious focal deficits  Skin is a bronze color, small dime sized, shallow wound on back      LABS:  CBC 12-02-23 @ 07:43                        9.0    13.10 )-----------( 239                   27.1       Hgb trend: 9.0 <-- , 9.2 <-- , 8.2 <--   WBC trend: 13.10 <-- , 10.46 <-- , 8.08 <--       CMP 12-02-23 @ 07:43    142  |  104  |  67<H>  ----------------------------<  161<H>  4.1   |  22  |  4.43<H>    Ca    9.0      12-02-23 @ 07:43  Phos  6.1     12-02  Mg     2.1     12-02    TPro  6.0  /  Alb  3.1<L>  /  TBili  <0.2  /  DBili  x   /  AST  17  /  ALT  39  /  AlkPhos  108  12-01      Serum Cr trend: 4.43 <-- , 3.75 <-- , 2.86 <--         Cardiac Markers           STUDIES:           show

## 2024-02-19 NOTE — ED PROVIDER NOTE - PATIENT PORTAL LINK FT
You can access the FollowMyHealth Patient Portal offered by [facility] by registering at the following website: [email or web address]. By joining [employer]’s FollowMyHealth portal, you will also be able to view your health information using other applications (apps) compatible with our system.

## 2024-02-19 NOTE — ED PROVIDER NOTE - OBJECTIVE STATEMENT
43-year-old male with no pertinent past medical history presents status post cat bite prior to arrival.  Patient states he gave his friend's cat chicken, states his cat jumped on him and bit his neck.  Patient states cat bit anterior neck and scratched his scalp.  Patient states he cleaned wound with soap and water prior to arrival.  Patient admits to dizziness and subjective hoarseness, but denies any fevers, chest pain, shortness of breath, vision change, numbness, weakness, difficulty speaking, difficulty walking, drooling, or rash.  Patient unsure of last tetanus vaccine.  Patient states his neighbor is able to observe cat, unsure of rabies vaccination status.  No other complaints.

## 2024-02-20 VITALS
OXYGEN SATURATION: 97 % | SYSTOLIC BLOOD PRESSURE: 137 MMHG | DIASTOLIC BLOOD PRESSURE: 87 MMHG | RESPIRATION RATE: 16 BRPM | HEART RATE: 58 BPM | TEMPERATURE: 98 F

## 2024-03-08 ENCOUNTER — NON-APPOINTMENT (OUTPATIENT)
Age: 44
End: 2024-03-08

## 2024-05-31 NOTE — ED PROVIDER NOTE - ENMT, MLM
"Mom  called and said pt hit his left cheek on the fireplace mantel this morning and developed swelling and a bruise right away.  Injured area is about 1 1/2 inches long.  There is an abrasion but no cut.  No bleeding from mouth.  Mom took patient to  in [place] and was told there were \"4 people a head of her\".  They went home and now mom is calling.  Pt does not seem to be in pain.  Swelling has gone down most of the way.  Chewing and feeding normally.  Mom chooses to go to [facility] now.  " Airway patent, Nasal mucosa clear. Mouth with normal mucosa. Throat has no vesicles, no oropharyngeal exudates and uvula is midline. missing molar and lateral incisors. unable to break tongue depressor. able to open and close jaw easily

## 2024-08-12 NOTE — ED PROVIDER NOTE - DATE/TIME 1
Prep Survey      Flowsheet Row Responses   [facility] facility patient discharged from? [facility]   Is LACE score < 7 ? No   Eligibility Readm Mgmt   Discharge diagnosis Unstable angina,Hypotension,Hypomagnesemia   Does the patient have one of the following disease processes/diagnoses(primary or secondary)? Other   Does the patient have Home health ordered? No   Is there a DME ordered? No   Prep survey completed? Yes            Ange MORELAND - Registered Nurse           01-Aug-2018 05:42

## 2025-01-09 NOTE — ED ADULT NURSE NOTE - NS ED NOTE ABUSE SUSPICION NEGLECT YN
RECEIVED UPDATE FROM JOHN WITH [facility]. PER JOHN FROM [facility] PATIENT WILL BE TRANSPORTED TO INPATIENT HOSPICE UNIT BETWEEN 7PM-8PM TONIGHT.   No

## 2025-01-14 ENCOUNTER — EMERGENCY (EMERGENCY)
Facility: HOSPITAL | Age: 45
LOS: 1 days | Discharge: LEFT BEFORE DISCHARGE | End: 2025-01-14
Attending: EMERGENCY MEDICINE
Payer: SELF-PAY

## 2025-01-14 VITALS
OXYGEN SATURATION: 98 % | WEIGHT: 149.91 LBS | RESPIRATION RATE: 18 BRPM | DIASTOLIC BLOOD PRESSURE: 89 MMHG | SYSTOLIC BLOOD PRESSURE: 150 MMHG | HEART RATE: 67 BPM | TEMPERATURE: 99 F | HEIGHT: 68 IN

## 2025-01-14 PROCEDURE — L9991: CPT

## 2025-01-15 PROCEDURE — 71045 X-RAY EXAM CHEST 1 VIEW: CPT | Mod: 26

## 2025-01-15 PROCEDURE — 71045 X-RAY EXAM CHEST 1 VIEW: CPT

## 2025-01-15 PROCEDURE — 93005 ELECTROCARDIOGRAM TRACING: CPT

## 2025-01-15 NOTE — ED ADULT NURSE NOTE - NS_ED_FAMBEDSIDE4_ED_ALL_ED
Paz MATA &  aware.  EKG done, Vital signs within normal limits, PT wanted to smoke and stated feeling disrespected so walked out.

## 2025-03-28 ENCOUNTER — EMERGENCY (EMERGENCY)
Facility: HOSPITAL | Age: 45
LOS: 1 days | End: 2025-03-28

## 2025-03-28 PROCEDURE — L9992: CPT

## 2025-06-23 NOTE — ED ADULT TRIAGE NOTE - CHIEF COMPLAINT QUOTE
[facility]  (A Division of [facility])  Renee Stanford MD   Office Tel:  [phone] Option #8                                                               Date of Admission: 6/14/2025       ID Consult for antimicrobial management suspected esophageal candidiasis   PCP: Emeli Duckworth MD    C/C: stridor/SOB    Current Antimicrobials:    Prior Antimicrobials:    Cefepime 6/20 to date #3  Fluconazole 6/14-> Micafungin  150 mg daily   #8     Zithro 6/16 to 20  CAX 6/16 to 20     Allergy to antibiotics- NA     Assessment:     Critically ill patient with:    Acute resp failure/ARDS- requiring high oxygen support  Infection with Serratia/Klebsiella -resp cultures- 6/16   Esophagitis- presumed candidal--Underlying immunodeficiency?  Fungitell < 31, galactomannan normal( 0.03)= 6/14--makes systemci fungemia/PCP less likley-> repeat testing pending  Stridor/ candidal esophagitis/intubated- 10/2024 in [facility] as well- new info from his Primary MD    COPD    Transaminitis- resolved    6/14 - blood cutlure X2: NGSF, resp culture- mixed GNR- normal resp kenia        -UA no pyuria, urine cx-Neg        -X-ray-bibasilar scarring/atelectasis, neck x-ray- no acute pathology        -CT CAP: Bibasilar atelectasis/pneumonia left greater than right.  Bullous emphysematous change.  Procal 0.03    6/16- KOH from mouth- no yeast, culture in progress         - respiratory culture- Klebsiella and Serratia growth--SS to cefepime         =MRSA screen negative    Concern for immunodeficiency  -HIV 1/2- Non -reactive 6/14/25, HIV PCR -<20  -CD4 120  -Immunoglobulins normal    Co-morbidities:  Hyponatremia/SIADH-improved  Hypertension  COPD  Chronic hepatitis C- treated with Roderick Guzman    Recommendation -- ID related:     Cont cefepime IV X7 days- for coverage of Serratia/Kleb  FU repeat Fungitell -6/20  Monitor WBC  Steroid taper per ICU team  Supportive care and additional  treatment per respective team     DW ICU MD-Dr Bharat Law/ICU nurse    Dr Muller covering by Phone on Tuesday () , will resume care on Wednesday, 06/25/25.  Please call their number OR via [employer] for questions/concerns until I resume care . Thanks.       Subjective:     Patient seen and examined for follow-up in ICU   Temp 99.6  Intubated - non responsive- sedated withi three drugs  Oxygen/FIOW to 100% and PEEP of 8- over weekend. Some secretions present     Plans for Bronch/EGD once more stable Vent status wise per last week plans    Additional outside info:  Gaby Ibarra( patient PCP) . She give history of hep C infection- treated with Harvoni by Dr Guzman, and with negative PCR On FU, history of Cirrhosis, copd. No recurrent pna/skin infections or family history significant for auto-immune disease on his record. He had episode of stridor/intubation/treatment for esophageal candidiasis in [place], [place] 10/2024.     Chart reviewed-notes/cultures/labs and imaging reports. WBC bump to 13.4KCXR this am:  Improved depth of inspiration with mild left basilar atelectasis and small pleural effusion. Electronically signed by Dami Solo       HPI per Dr Perez:     Beny Knapp is a 60 y.o. male White (non-) pmh hypertension tobacco and polysubstance abuse COPD chronic hepatitis was admitted to [facility] 6/14/2025 .  He was brought in by EMS with difficulty breathing and possible foreign body obstruction of airway.  He was intubated in the ED for airway protection chest x-ray showed mild pulmonary edema x-ray of soft tissue of the neck had no acute pathology noted at the time of intubation esophageal coating consistent with candidiasis was noted and infectious disease consultation was requested.  He was started at that time on fluconazole 400 mg IV followed by 14 days of 200 mg.  White blood cell count on admission was 7.4 drug screen was positive for fentanyl,  Pt BIBA c/o left side chest pain x 3 days with shortness of breathe

## 2025-07-12 ENCOUNTER — EMERGENCY (EMERGENCY)
Facility: HOSPITAL | Age: 45
LOS: 1 days | End: 2025-07-12
Attending: EMERGENCY MEDICINE
Payer: MEDICAID

## 2025-07-12 VITALS
HEART RATE: 79 BPM | WEIGHT: 130.07 LBS | RESPIRATION RATE: 17 BRPM | DIASTOLIC BLOOD PRESSURE: 96 MMHG | TEMPERATURE: 99 F | OXYGEN SATURATION: 99 % | SYSTOLIC BLOOD PRESSURE: 150 MMHG

## 2025-07-12 VITALS
DIASTOLIC BLOOD PRESSURE: 90 MMHG | OXYGEN SATURATION: 99 % | HEART RATE: 74 BPM | RESPIRATION RATE: 18 BRPM | SYSTOLIC BLOOD PRESSURE: 150 MMHG | TEMPERATURE: 98 F

## 2025-07-12 LAB
APPEARANCE UR: CLEAR — SIGNIFICANT CHANGE UP
BACTERIA # UR AUTO: NEGATIVE /HPF — SIGNIFICANT CHANGE UP
BILIRUB UR-MCNC: NEGATIVE — SIGNIFICANT CHANGE UP
COLOR SPEC: YELLOW — SIGNIFICANT CHANGE UP
DIFF PNL FLD: NEGATIVE — SIGNIFICANT CHANGE UP
EPI CELLS # UR: SIGNIFICANT CHANGE UP
GLUCOSE UR QL: NEGATIVE MG/DL — SIGNIFICANT CHANGE UP
HIV 1 & 2 AB SERPL IA.RAPID: SIGNIFICANT CHANGE UP
KETONES UR QL: NEGATIVE MG/DL — SIGNIFICANT CHANGE UP
LEUKOCYTE ESTERASE UR-ACNC: NEGATIVE — SIGNIFICANT CHANGE UP
NITRITE UR-MCNC: NEGATIVE — SIGNIFICANT CHANGE UP
PH UR: 5.5 — SIGNIFICANT CHANGE UP (ref 5–8)
PROT UR-MCNC: 30 MG/DL
RBC CASTS # UR COMP ASSIST: 0 /HPF — SIGNIFICANT CHANGE UP (ref 0–4)
SP GR SPEC: 1.02 — SIGNIFICANT CHANGE UP (ref 1–1.03)
UROBILINOGEN FLD QL: 1 MG/DL — SIGNIFICANT CHANGE UP (ref 0.2–1)
WBC UR QL: 2 /HPF — SIGNIFICANT CHANGE UP (ref 0–5)

## 2025-07-12 PROCEDURE — 99284 EMERGENCY DEPT VISIT MOD MDM: CPT | Mod: 25

## 2025-07-12 PROCEDURE — 86780 TREPONEMA PALLIDUM: CPT

## 2025-07-12 PROCEDURE — 80074 ACUTE HEPATITIS PANEL: CPT

## 2025-07-12 PROCEDURE — 87591 N.GONORRHOEAE DNA AMP PROB: CPT

## 2025-07-12 PROCEDURE — 86703 HIV-1/HIV-2 1 RESULT ANTBDY: CPT

## 2025-07-12 PROCEDURE — 36415 COLL VENOUS BLD VENIPUNCTURE: CPT

## 2025-07-12 PROCEDURE — 76870 US EXAM SCROTUM: CPT

## 2025-07-12 PROCEDURE — 81001 URINALYSIS AUTO W/SCOPE: CPT

## 2025-07-12 PROCEDURE — 87086 URINE CULTURE/COLONY COUNT: CPT

## 2025-07-12 PROCEDURE — 99284 EMERGENCY DEPT VISIT MOD MDM: CPT

## 2025-07-12 PROCEDURE — 96372 THER/PROPH/DIAG INJ SC/IM: CPT

## 2025-07-12 PROCEDURE — 76870 US EXAM SCROTUM: CPT | Mod: 26

## 2025-07-12 PROCEDURE — 86803 HEPATITIS C AB TEST: CPT

## 2025-07-12 PROCEDURE — 87491 CHLMYD TRACH DNA AMP PROBE: CPT

## 2025-07-12 RX ORDER — CEFTRIAXONE 500 MG/1
500 INJECTION, POWDER, FOR SOLUTION INTRAMUSCULAR; INTRAVENOUS ONCE
Refills: 0 | Status: COMPLETED | OUTPATIENT
Start: 2025-07-12 | End: 2025-07-12

## 2025-07-12 RX ORDER — DOXYCYCLINE HYCLATE 100 MG
1 TABLET ORAL
Qty: 20 | Refills: 0
Start: 2025-07-12 | End: 2025-07-21

## 2025-07-12 RX ADMIN — CEFTRIAXONE 500 MILLIGRAM(S): 500 INJECTION, POWDER, FOR SOLUTION INTRAMUSCULAR; INTRAVENOUS at 16:12

## 2025-07-12 NOTE — ED PROVIDER NOTE - OBJECTIVE STATEMENT
Patient is a 45-year-old complaining of burning testicular pain for on and off for the last several months to a year patient also has noted intermittent discharge patient cannot produce a discharge currently patient having burning on urination

## 2025-07-12 NOTE — ED ADULT NURSE NOTE - EXPLANATION OF PATIENT'S REASON FOR LEAVING
Patient states he did not want to wait for the results and states he has been in the ED for too long.

## 2025-07-12 NOTE — ED ADULT TRIAGE NOTE - CHIEF COMPLAINT QUOTE
B/L testicular swelling, burning sensation upon urination on/off for the past 6  months, penile discharge x today

## 2025-07-12 NOTE — ED ADULT NURSE NOTE - OBJECTIVE STATEMENT
44 yo male sitting on a chair c/o clear, watery penile discharge that today and, intermittent swelling and pain of bilateral testicle that for 6 months,

## 2025-07-12 NOTE — ED PROVIDER NOTE - PATIENT PORTAL LINK FT
You can access the FollowMyHealth Patient Portal offered by [facility] by registering at the following website: [email or web address]. By joining [employer]’s FollowMyHealth portal, you will also be able to view your health information using other applications (apps) compatible with our system.

## 2025-07-12 NOTE — ED PROVIDER NOTE - NSFOLLOWUPINSTRUCTIONS_ED_ALL_ED_FT
hedy antibiotics    Follow up results of sonogram        Log Out.  [employer] CareNotes®  :  [facility]        SEXUALLY TRANSMITTED DISEASES - Ambulatory Care    Sexually Transmitted Diseases    AMBULATORY CARE:    A sexually transmitted disease (STD) means signs or symptoms of a sexually transmitted infection (STI) have developed. An STI happens when bacteria or a virus are spread through oral, genital, or anal sex. Some examples of STDs are HIV, chlamydia, syphilis, and gonorrhea.    Common signs and symptoms may include one or more of the following, depending on the STD:    Blisters, warts, sores, or a rash on your skin that may be painful    Discharge from the penis, vagina, or anus that may have a foul smell    Fever, muscle pain, or swollen lymph nodes in the groin    Inflammation and itching    Pelvic or abdominal pain, or pain during sex or when urinating    Sore throat, mouth ulcers, or trouble swallowing    Vaginal bleeding or spotting after sex  Seek care immediately if:    You have genital swelling or pain, or unusual bleeding.    You have joint pain, a rash, swollen lymph nodes, or night sweats.    You have severe abdominal pain.  Call your doctor if:    You have a fever.    Your symptoms do not go away or get worse, even after treatment.    You have bleeding or pain during sex.    You or your female partner may be pregnant.    You have questions or concerns about your condition or care.  Treatment for an STD depends on the STD you have. Antibiotics may be given for a bacterial infection. Post-exposure prophylaxis (PEP) means you take antibiotics within 72 hours of possible exposure. Your healthcare provider may give you a prescription so you can have antibiotics available before you need them. Antivirals may be given for a viral infection. Antifungals may be given for a fungal infection, such as a yeast infection. Early treatment may decrease the risk for certain cancers. Early treatment can also help prevent infertility.    Help prevent the spread of an STI: Ask your healthcare provider for more information about the following safe sex practices:    Use a male or female condom during sex. This includes oral, genital, or anal sex. Use a new condom each time. Condoms help prevent pregnancy and STIs. Use latex condoms, if possible. Lambskin (also called sheepskin or natural membrane) condoms do not protect against STIs. A polyurethane condom can be used if you or your partner is allergic to latex. Condoms should be used with a second form of birth control to help prevent pregnancy and STIs. Do not use male and female condoms together.    Do not have sex with someone who has an STI or STD. This includes oral and anal sex.    Limit sex partners. Ask about your partner's sex history before you have sex.    Get screened regularly if you are sexually active. Common tests include chlamydia, gonorrhea, HIV, hepatitis, and syphilis.    Tell your sex partners if you have an STI. Your partners may need to be tested and treated. Do not have sex while you are being treated for an STI. Do not have sex with a partner who is being treated.    Ask about medicines to lower your risk for some STIs:  Vaccines can help protect you from hepatitis A, hepatitis B, and the human papillomavirus (HPV). The HPV vaccine is usually given at 11 years, but it may be given through 26 years to both females and males. Your provider can give you more information on vaccines to prevent STIs.    Pre-exposure prophylaxis (PrEP) may be given if you are at high risk for HIV. PrEP is taken every day to prevent the virus from fully infecting the body.    If you are female, do not douche. Douching upsets the normal balance of bacteria found in your vagina. It does not prevent or clear up vaginal infections.  Follow up with your doctor as directed: You may need more tests. If you have an STD, you may need immediate or ongoing treatment. Your doctor may also refer you to a specialist who can provide specific treatment. Write down your questions so you remember to ask them during your visits.    © [employer] 1973, 2025    	  back to top            © [employer] 1973, 2025

## 2025-07-12 NOTE — ED PROVIDER NOTE - CLINICAL SUMMARY MEDICAL DECISION MAKING FREE TEXT BOX
Will patient complaining of burning testicular pain intermittent discharge although not having one right now also burning on urination patient has had exposure to STDs physical exam is normal will treat with Rocephin for gonorrhea and start on doxycycline patient will have an HIV test RPR hepatitis profile to be reevaluated

## 2025-07-13 LAB
HAV IGM SER-ACNC: SIGNIFICANT CHANGE UP
HBV CORE IGM SER-ACNC: SIGNIFICANT CHANGE UP
HBV SURFACE AG SER-ACNC: SIGNIFICANT CHANGE UP
HCV AB S/CO SERPL IA: 0.4 S/CO — SIGNIFICANT CHANGE UP (ref 0–0.79)
HCV AB S/CO SERPL IA: 0.41 S/CO — SIGNIFICANT CHANGE UP (ref 0–0.79)
HCV AB SERPL-IMP: SIGNIFICANT CHANGE UP
HCV AB SERPL-IMP: SIGNIFICANT CHANGE UP
T PALLIDUM AB TITR SER: NEGATIVE — SIGNIFICANT CHANGE UP

## 2025-07-14 LAB
C TRACH RRNA SPEC QL NAA+PROBE: SIGNIFICANT CHANGE UP
CULTURE RESULTS: NO GROWTH — SIGNIFICANT CHANGE UP
N GONORRHOEA RRNA SPEC QL NAA+PROBE: SIGNIFICANT CHANGE UP
SPECIMEN SOURCE: SIGNIFICANT CHANGE UP

## 2025-08-09 ENCOUNTER — EMERGENCY (EMERGENCY)
Facility: HOSPITAL | Age: 45
LOS: 1 days | End: 2025-08-09
Attending: EMERGENCY MEDICINE
Payer: MEDICAID

## 2025-08-09 VITALS
WEIGHT: 138.01 LBS | TEMPERATURE: 98 F | SYSTOLIC BLOOD PRESSURE: 133 MMHG | DIASTOLIC BLOOD PRESSURE: 91 MMHG | OXYGEN SATURATION: 98 % | RESPIRATION RATE: 18 BRPM | HEIGHT: 68 IN | HEART RATE: 86 BPM

## 2025-08-09 LAB
APPEARANCE UR: CLEAR — SIGNIFICANT CHANGE UP
BILIRUB UR-MCNC: NEGATIVE — SIGNIFICANT CHANGE UP
COLOR SPEC: YELLOW — SIGNIFICANT CHANGE UP
DIFF PNL FLD: NEGATIVE — SIGNIFICANT CHANGE UP
GLUCOSE UR QL: NEGATIVE MG/DL — SIGNIFICANT CHANGE UP
KETONES UR QL: 15 MG/DL
LEUKOCYTE ESTERASE UR-ACNC: NEGATIVE — SIGNIFICANT CHANGE UP
NITRITE UR-MCNC: NEGATIVE — SIGNIFICANT CHANGE UP
PH UR: 5.5 — SIGNIFICANT CHANGE UP (ref 5–8)
PROT UR-MCNC: NEGATIVE MG/DL — SIGNIFICANT CHANGE UP
SP GR SPEC: 1.03 — SIGNIFICANT CHANGE UP (ref 1–1.03)
UROBILINOGEN FLD QL: 0.2 MG/DL — SIGNIFICANT CHANGE UP (ref 0.2–1)

## 2025-08-09 PROCEDURE — 87491 CHLMYD TRACH DNA AMP PROBE: CPT

## 2025-08-09 PROCEDURE — 99283 EMERGENCY DEPT VISIT LOW MDM: CPT

## 2025-08-09 PROCEDURE — 87086 URINE CULTURE/COLONY COUNT: CPT

## 2025-08-09 PROCEDURE — 99284 EMERGENCY DEPT VISIT MOD MDM: CPT

## 2025-08-09 PROCEDURE — 81003 URINALYSIS AUTO W/O SCOPE: CPT

## 2025-08-09 PROCEDURE — 87591 N.GONORRHOEAE DNA AMP PROB: CPT

## 2025-08-10 LAB
CULTURE RESULTS: SIGNIFICANT CHANGE UP
SPECIMEN SOURCE: SIGNIFICANT CHANGE UP

## 2025-08-11 LAB
C TRACH RRNA SPEC QL NAA+PROBE: SIGNIFICANT CHANGE UP
N GONORRHOEA RRNA SPEC QL NAA+PROBE: SIGNIFICANT CHANGE UP
SPECIMEN SOURCE: SIGNIFICANT CHANGE UP